# Patient Record
Sex: MALE | Race: WHITE
[De-identification: names, ages, dates, MRNs, and addresses within clinical notes are randomized per-mention and may not be internally consistent; named-entity substitution may affect disease eponyms.]

---

## 2017-04-25 ENCOUNTER — HOSPITAL ENCOUNTER (EMERGENCY)
Dept: HOSPITAL 80 - FED | Age: 32
Discharge: HOME | End: 2017-04-25
Payer: COMMERCIAL

## 2017-04-25 VITALS
TEMPERATURE: 97.9 F | SYSTOLIC BLOOD PRESSURE: 131 MMHG | OXYGEN SATURATION: 96 % | DIASTOLIC BLOOD PRESSURE: 68 MMHG | HEART RATE: 50 BPM | RESPIRATION RATE: 16 BRPM

## 2017-04-25 DIAGNOSIS — R00.2: Primary | ICD-10-CM
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
## 2017-04-25 NOTE — CPEKG
Heart Rate: 50

RR Interval: 1200

P-R Interval: 180

QRSD Interval: 92

QT Interval: 436

QTC Interval: 398

P Axis: 40

QRS Axis: 57

T Wave Axis: 47

EKG Severity - NORMAL ECG -

EKG Impression: SINUS RHYTHM

EKG Impression: ST ELEV, PROBABLE NORMAL EARLY REPOL PATTERN

Electronically Signed By: Berhane Bender 25-Apr-2017 09:25:59

## 2018-07-11 ENCOUNTER — HOSPITAL ENCOUNTER (OUTPATIENT)
Dept: HOSPITAL 80 - FCPNEURO | Age: 33
End: 2018-07-11
Attending: PSYCHIATRY & NEUROLOGY
Payer: COMMERCIAL

## 2018-07-11 DIAGNOSIS — R20.2: Primary | ICD-10-CM

## 2018-07-11 NOTE — CPEEG
[f rep st]



                                                      ELECTROENCEPHALOGRAM





EEG



DATE OF STUDY:  07/11/2018



DATE OF INTERPRETATION:  07/11/2018





INTERPRETATION:  Normal EEG during wakefulness and sleep.  There were no potentially epileptogenic ab
normalities present during the recording.



REPORT:  This EEG contains 10 Hz alpha activity to the posterior head regions.  There was no abnormal
 activation at rest, during photic stimulation or hyperventilation.  The patient became drowsy and fe
ll asleep during the study.  There was no abnormal activation during drowsiness, sleep, or during mary
es of arousal.





Job #:  [id number]